# Patient Record
Sex: FEMALE | Race: WHITE | NOT HISPANIC OR LATINO | Employment: OTHER | URBAN - METROPOLITAN AREA
[De-identification: names, ages, dates, MRNs, and addresses within clinical notes are randomized per-mention and may not be internally consistent; named-entity substitution may affect disease eponyms.]

---

## 2023-05-19 ENCOUNTER — TELEPHONE (OUTPATIENT)
Dept: CARDIOLOGY | Facility: CLINIC | Age: 78
End: 2023-05-19
Payer: MEDICARE

## 2023-05-19 NOTE — TELEPHONE ENCOUNTER
LVM Dr. Bunn is not going to [place] right now. We can schedule her for the slide office if she would like, asked her to call back

## 2023-05-19 NOTE — TELEPHONE ENCOUNTER
----- Message from Charissa Reed sent at 5/19/2023  8:54 AM CDT -----  Regarding: appointment  Contact: patient  Type:  Sooner Appointment Request    Caller is requesting a sooner appointment.  Caller declined first available appointment listed below.  Caller will not accept being placed on the waitlist and is requesting a message be sent to doctor.    Name of Caller:  patient  When is the first available appointment?    Symptoms:  annual  Best Call Back Number:  [phone] (home)     Additional Information:  Patient was a previous patient of Dr. Rossi.  Would like to see him in [place].  Please call patient to schedule.  Thanks!